# Patient Record
Sex: FEMALE | Race: WHITE | NOT HISPANIC OR LATINO
[De-identification: names, ages, dates, MRNs, and addresses within clinical notes are randomized per-mention and may not be internally consistent; named-entity substitution may affect disease eponyms.]

---

## 2017-04-26 ENCOUNTER — APPOINTMENT (OUTPATIENT)
Dept: OBGYN | Facility: CLINIC | Age: 22
End: 2017-04-26

## 2017-04-26 VITALS
HEIGHT: 62 IN | WEIGHT: 150 LBS | DIASTOLIC BLOOD PRESSURE: 84 MMHG | BODY MASS INDEX: 27.6 KG/M2 | SYSTOLIC BLOOD PRESSURE: 127 MMHG

## 2017-04-27 LAB
HBV SURFACE AG SER QL: NONREACTIVE
HCV AB SER QL: NONREACTIVE
HCV S/CO RATIO: 0.13 S/CO
HIV1+2 AB SPEC QL IA.RAPID: NONREACTIVE
T PALLIDUM AB SER QL IA: NEGATIVE

## 2017-04-28 LAB
C TRACH RRNA SPEC QL NAA+PROBE: NORMAL
N GONORRHOEA RRNA SPEC QL NAA+PROBE: NORMAL
SOURCE TP AMPLIFICATION: NORMAL

## 2017-05-02 LAB — CYTOLOGY CVX/VAG DOC THIN PREP: NORMAL

## 2017-08-20 ENCOUNTER — TRANSCRIPTION ENCOUNTER (OUTPATIENT)
Age: 22
End: 2017-08-20

## 2017-09-08 ENCOUNTER — RX RENEWAL (OUTPATIENT)
Age: 22
End: 2017-09-08

## 2018-08-16 ENCOUNTER — APPOINTMENT (OUTPATIENT)
Dept: OBGYN | Facility: CLINIC | Age: 23
End: 2018-08-16
Payer: COMMERCIAL

## 2018-08-16 VITALS
BODY MASS INDEX: 30.36 KG/M2 | HEIGHT: 62 IN | SYSTOLIC BLOOD PRESSURE: 124 MMHG | DIASTOLIC BLOOD PRESSURE: 83 MMHG | WEIGHT: 165 LBS

## 2018-08-16 PROCEDURE — 36415 COLL VENOUS BLD VENIPUNCTURE: CPT

## 2018-08-16 PROCEDURE — 99395 PREV VISIT EST AGE 18-39: CPT

## 2018-08-16 RX ORDER — ETONOGESTREL AND ETHINYL ESTRADIOL .12; .015 MG/D; MG/D
0.12-0.015 INSERT, EXTENDED RELEASE VAGINAL
Qty: 90 | Refills: 3 | Status: ACTIVE | COMMUNITY
Start: 2018-08-16 | End: 1900-01-01

## 2018-08-17 LAB — HIV1+2 AB SPEC QL IA.RAPID: NONREACTIVE

## 2018-08-20 LAB
C TRACH RRNA SPEC QL NAA+PROBE: NOT DETECTED
HBV SURFACE AG SER QL: NONREACTIVE
HCV AB SER QL: NONREACTIVE
HCV S/CO RATIO: 0.2 S/CO
N GONORRHOEA RRNA SPEC QL NAA+PROBE: NOT DETECTED
SOURCE TP AMPLIFICATION: NORMAL
T PALLIDUM AB SER QL IA: NEGATIVE

## 2018-08-22 ENCOUNTER — RX RENEWAL (OUTPATIENT)
Age: 23
End: 2018-08-22

## 2018-08-22 LAB — CYTOLOGY CVX/VAG DOC THIN PREP: NORMAL

## 2018-08-22 RX ORDER — NORETHINDRONE ACETATE AND ETHINYL ESTRADIOL AND FERROUS FUMARATE 1MG-20(21)
1-20 KIT ORAL
Qty: 84 | Refills: 3 | Status: ACTIVE | COMMUNITY
Start: 2017-04-26 | End: 1900-01-01

## 2019-02-19 ENCOUNTER — TRANSCRIPTION ENCOUNTER (OUTPATIENT)
Age: 24
End: 2019-02-19

## 2020-03-25 DIAGNOSIS — L70.9 LONG TERM (CURRENT) USE OF HORMONAL CONTRACEPTIVES: ICD-10-CM

## 2020-03-25 DIAGNOSIS — Z79.3 LONG TERM (CURRENT) USE OF HORMONAL CONTRACEPTIVES: ICD-10-CM

## 2020-05-11 RX ORDER — DROSPIRENONE AND ETHINYL ESTRADIOL 0.03MG-3MG
3-0.03 KIT ORAL DAILY
Qty: 1 | Refills: 0 | Status: ACTIVE | COMMUNITY
Start: 2020-03-25

## 2020-06-25 ENCOUNTER — APPOINTMENT (OUTPATIENT)
Dept: OBGYN | Facility: CLINIC | Age: 25
End: 2020-06-25
Payer: COMMERCIAL

## 2020-06-25 VITALS
BODY MASS INDEX: 32.2 KG/M2 | WEIGHT: 175 LBS | DIASTOLIC BLOOD PRESSURE: 88 MMHG | HEIGHT: 62 IN | SYSTOLIC BLOOD PRESSURE: 145 MMHG

## 2020-06-25 PROCEDURE — 99395 PREV VISIT EST AGE 18-39: CPT

## 2020-06-25 RX ORDER — NORGESTIMATE AND ETHINYL ESTRADIOL 7DAYSX3 28
0.18/0.215/0.25 KIT ORAL DAILY
Qty: 90 | Refills: 3 | Status: ACTIVE | COMMUNITY
Start: 2020-06-25 | End: 1900-01-01

## 2020-06-25 NOTE — PHYSICAL EXAM
[Awake] : awake [Alert] : alert [Acute Distress] : no acute distress [Nipple Discharge] : no nipple discharge [Mass] : no breast mass [Soft] : soft [Tender] : non tender [Axillary LAD] : no axillary lymphadenopathy [Oriented x3] : oriented to person, place, and time [Uterine Adnexae] : were not tender and not enlarged [Normal] : uterus [No Bleeding] : there was no active vaginal bleeding

## 2020-07-02 LAB — CYTOLOGY CVX/VAG DOC THIN PREP: NORMAL

## 2020-07-13 ENCOUNTER — RX RENEWAL (OUTPATIENT)
Age: 25
End: 2020-07-13

## 2020-08-09 ENCOUNTER — RX RENEWAL (OUTPATIENT)
Age: 25
End: 2020-08-09

## 2020-09-02 ENCOUNTER — RX RENEWAL (OUTPATIENT)
Age: 25
End: 2020-09-02

## 2020-12-04 ENCOUNTER — TRANSCRIPTION ENCOUNTER (OUTPATIENT)
Age: 25
End: 2020-12-04

## 2021-05-24 ENCOUNTER — RX RENEWAL (OUTPATIENT)
Age: 26
End: 2021-05-24

## 2021-07-06 ENCOUNTER — APPOINTMENT (OUTPATIENT)
Dept: OBGYN | Facility: CLINIC | Age: 26
End: 2021-07-06
Payer: COMMERCIAL

## 2021-07-06 VITALS
SYSTOLIC BLOOD PRESSURE: 129 MMHG | WEIGHT: 155 LBS | HEIGHT: 62 IN | DIASTOLIC BLOOD PRESSURE: 83 MMHG | BODY MASS INDEX: 28.52 KG/M2

## 2021-07-06 PROCEDURE — 99072 ADDL SUPL MATRL&STAF TM PHE: CPT

## 2021-07-06 PROCEDURE — 99395 PREV VISIT EST AGE 18-39: CPT

## 2021-07-06 RX ORDER — DROSPIRENONE AND ETHINYL ESTRADIOL 0.03MG-3MG
3-0.03 KIT ORAL DAILY
Qty: 90 | Refills: 3 | Status: ACTIVE | COMMUNITY
Start: 2021-07-06 | End: 1900-01-01

## 2021-07-12 LAB — CYTOLOGY CVX/VAG DOC THIN PREP: NORMAL

## 2021-08-16 ENCOUNTER — RX RENEWAL (OUTPATIENT)
Age: 26
End: 2021-08-16

## 2021-11-09 ENCOUNTER — RX RENEWAL (OUTPATIENT)
Age: 26
End: 2021-11-09

## 2022-01-31 ENCOUNTER — RX RENEWAL (OUTPATIENT)
Age: 27
End: 2022-01-31

## 2022-04-21 ENCOUNTER — RX RENEWAL (OUTPATIENT)
Age: 27
End: 2022-04-21

## 2022-07-14 ENCOUNTER — RX RENEWAL (OUTPATIENT)
Age: 27
End: 2022-07-14

## 2022-07-14 RX ORDER — DROSPIRENONE AND ETHINYL ESTRADIOL 0.03MG-3MG
3-0.03 KIT ORAL
Qty: 1 | Refills: 0 | Status: ACTIVE | COMMUNITY
Start: 2020-07-13 | End: 1900-01-01

## 2022-08-25 ENCOUNTER — APPOINTMENT (OUTPATIENT)
Dept: OBGYN | Facility: CLINIC | Age: 27
End: 2022-08-25

## 2022-08-25 VITALS
SYSTOLIC BLOOD PRESSURE: 138 MMHG | HEIGHT: 61 IN | DIASTOLIC BLOOD PRESSURE: 95 MMHG | WEIGHT: 160 LBS | BODY MASS INDEX: 30.21 KG/M2

## 2022-08-25 PROCEDURE — 99395 PREV VISIT EST AGE 18-39: CPT

## 2022-09-06 LAB — CYTOLOGY CVX/VAG DOC THIN PREP: NORMAL

## 2023-02-14 ENCOUNTER — RX RENEWAL (OUTPATIENT)
Age: 28
End: 2023-02-14

## 2023-07-24 ENCOUNTER — RX RENEWAL (OUTPATIENT)
Age: 28
End: 2023-07-24

## 2023-07-24 RX ORDER — DROSPIRENONE AND ETHINYL ESTRADIOL 0.03MG-3MG
3-0.03 KIT ORAL
Qty: 28 | Refills: 0 | Status: ACTIVE | COMMUNITY
Start: 2022-08-25 | End: 1900-01-01

## 2023-09-11 ENCOUNTER — APPOINTMENT (OUTPATIENT)
Dept: OBGYN | Facility: CLINIC | Age: 28
End: 2023-09-11
Payer: SELF-PAY

## 2023-09-11 VITALS
WEIGHT: 170 LBS | BODY MASS INDEX: 32.1 KG/M2 | HEIGHT: 61 IN | SYSTOLIC BLOOD PRESSURE: 148 MMHG | DIASTOLIC BLOOD PRESSURE: 106 MMHG

## 2023-09-11 DIAGNOSIS — Z01.419 ENCOUNTER FOR GYNECOLOGICAL EXAMINATION (GENERAL) (ROUTINE) W/OUT ABNORMAL FINDINGS: ICD-10-CM

## 2023-09-11 PROCEDURE — 99395 PREV VISIT EST AGE 18-39: CPT

## 2023-09-13 LAB
C TRACH RRNA SPEC QL NAA+PROBE: NOT DETECTED
HBV SURFACE AG SER QL: NONREACTIVE
HCV AB SER QL: NONREACTIVE
HCV S/CO RATIO: 0.06 S/CO
HIV1+2 AB SPEC QL IA.RAPID: NONREACTIVE
N GONORRHOEA RRNA SPEC QL NAA+PROBE: NOT DETECTED
SOURCE TP AMPLIFICATION: NORMAL
T PALLIDUM AB SER QL IA: NEGATIVE

## 2023-09-15 LAB — CYTOLOGY CVX/VAG DOC THIN PREP: NORMAL

## 2024-09-06 ENCOUNTER — NON-APPOINTMENT (OUTPATIENT)
Age: 29
End: 2024-09-06

## 2024-09-23 ENCOUNTER — APPOINTMENT (OUTPATIENT)
Dept: OBGYN | Facility: CLINIC | Age: 29
End: 2024-09-23

## 2024-10-13 ENCOUNTER — NON-APPOINTMENT (OUTPATIENT)
Age: 29
End: 2024-10-13

## 2024-12-02 ENCOUNTER — APPOINTMENT (OUTPATIENT)
Dept: OBGYN | Facility: CLINIC | Age: 29
End: 2024-12-02
Payer: COMMERCIAL

## 2024-12-02 ENCOUNTER — TRANSCRIPTION ENCOUNTER (OUTPATIENT)
Age: 29
End: 2024-12-02

## 2024-12-02 ENCOUNTER — ASOB RESULT (OUTPATIENT)
Age: 29
End: 2024-12-02

## 2024-12-02 ENCOUNTER — LABORATORY RESULT (OUTPATIENT)
Age: 29
End: 2024-12-02

## 2024-12-02 VITALS
WEIGHT: 190 LBS | BODY MASS INDEX: 37.3 KG/M2 | SYSTOLIC BLOOD PRESSURE: 125 MMHG | DIASTOLIC BLOOD PRESSURE: 85 MMHG | HEIGHT: 60 IN

## 2024-12-02 DIAGNOSIS — Z01.419 ENCOUNTER FOR GYNECOLOGICAL EXAMINATION (GENERAL) (ROUTINE) W/OUT ABNORMAL FINDINGS: ICD-10-CM

## 2024-12-02 PROCEDURE — 76817 TRANSVAGINAL US OBSTETRIC: CPT

## 2024-12-02 PROCEDURE — 36415 COLL VENOUS BLD VENIPUNCTURE: CPT

## 2024-12-02 PROCEDURE — 99395 PREV VISIT EST AGE 18-39: CPT

## 2024-12-03 LAB
ABO + RH PNL BLD: NORMAL
ALBUMIN SERPL ELPH-MCNC: 4.1 G/DL
ALP BLD-CCNC: 65 U/L
ALT SERPL-CCNC: 27 U/L
ANION GAP SERPL CALC-SCNC: 14 MMOL/L
AST SERPL-CCNC: 19 U/L
BILIRUB SERPL-MCNC: 0.2 MG/DL
BLD GP AB SCN SERPL QL: NORMAL
BUN SERPL-MCNC: 6 MG/DL
CALCIUM SERPL-MCNC: 9.1 MG/DL
CHLORIDE SERPL-SCNC: 100 MMOL/L
CO2 SERPL-SCNC: 22 MMOL/L
CREAT SERPL-MCNC: 0.46 MG/DL
EGFR: 132 ML/MIN/1.73M2
GLUCOSE SERPL-MCNC: 78 MG/DL
HBV SURFACE AG SER QL: NONREACTIVE
HCV AB SER QL: NONREACTIVE
HCV S/CO RATIO: 0.06 S/CO
HGB A MFR BLD: 97.2 %
HGB A2 MFR BLD: 2.8 %
HGB FRACT BLD-IMP: NORMAL
HIV1+2 AB SPEC QL IA.RAPID: NONREACTIVE
POTASSIUM SERPL-SCNC: 3.7 MMOL/L
PROT SERPL-MCNC: 6.9 G/DL
SODIUM SERPL-SCNC: 136 MMOL/L
TSH SERPL-ACNC: 1.73 UIU/ML

## 2024-12-04 LAB
B19V IGG SER QL IA: 4.42 INDEX
B19V IGG+IGM SER-IMP: NORMAL
B19V IGG+IGM SER-IMP: POSITIVE
B19V IGM FLD-ACNC: 0.18 INDEX
B19V IGM SER-ACNC: NEGATIVE
CYTOLOGY CVX/VAG DOC THIN PREP: NORMAL
HPV HIGH+LOW RISK DNA PNL CVX: NOT DETECTED
LEAD BLD-MCNC: <1 UG/DL
MEV IGG FLD QL IA: >300 AU/ML
MEV IGG+IGM SER-IMP: POSITIVE
RUBV IGG FLD-ACNC: 1.2 INDEX
RUBV IGG SER-IMP: POSITIVE
T PALLIDUM AB SER QL IA: NEGATIVE
VZV AB TITR SER: POSITIVE
VZV IGG SER IF-ACNC: 1.24 S/CO

## 2024-12-09 LAB
AR GENE MUT ANL BLD/T: NORMAL
CFTR MUT TESTED BLD/T: NEGATIVE
FMR1 GENE MUT ANL BLD/T: NORMAL
HCT VFR BLD CALC: 45.7 %
HGB BLD-MCNC: 14.5 G/DL
MCHC RBC-ENTMCNC: 31.9 PG
MCHC RBC-ENTMCNC: 32.4 G/DL
MCV RBC AUTO: 92.7 FL
MEV IGM SER QL: 0.41 AU
PLATELET # BLD AUTO: 384 K/UL
RBC # BLD: 4.55 M/UL
RBC # FLD: 12.2 %
WBC # FLD AUTO: 14.92 K/UL

## 2024-12-20 ENCOUNTER — APPOINTMENT (OUTPATIENT)
Dept: OBGYN | Facility: CLINIC | Age: 29
End: 2024-12-20
Payer: COMMERCIAL

## 2024-12-20 PROCEDURE — 36415 COLL VENOUS BLD VENIPUNCTURE: CPT

## 2025-01-02 ENCOUNTER — APPOINTMENT (OUTPATIENT)
Dept: OBGYN | Facility: CLINIC | Age: 30
End: 2025-01-02
Payer: COMMERCIAL

## 2025-01-02 ENCOUNTER — APPOINTMENT (OUTPATIENT)
Dept: ANTEPARTUM | Facility: CLINIC | Age: 30
End: 2025-01-02

## 2025-01-02 ENCOUNTER — ASOB RESULT (OUTPATIENT)
Age: 30
End: 2025-01-02

## 2025-01-02 PROCEDURE — 76801 OB US < 14 WKS SINGLE FETUS: CPT

## 2025-01-02 PROCEDURE — 0502F SUBSEQUENT PRENATAL CARE: CPT

## 2025-01-02 PROCEDURE — 76813 OB US NUCHAL MEAS 1 GEST: CPT
